# Patient Record
Sex: MALE | Race: OTHER | HISPANIC OR LATINO | Employment: FULL TIME | URBAN - METROPOLITAN AREA
[De-identification: names, ages, dates, MRNs, and addresses within clinical notes are randomized per-mention and may not be internally consistent; named-entity substitution may affect disease eponyms.]

---

## 2024-06-05 ENCOUNTER — HOSPITAL ENCOUNTER (EMERGENCY)
Facility: HOSPITAL | Age: 59
Discharge: ANOTHER HOSPITAL | End: 2024-06-05
Attending: EMERGENCY MEDICINE

## 2024-06-05 VITALS
HEART RATE: 71 BPM | WEIGHT: 180 LBS | BODY MASS INDEX: 28.93 KG/M2 | DIASTOLIC BLOOD PRESSURE: 64 MMHG | OXYGEN SATURATION: 100 % | SYSTOLIC BLOOD PRESSURE: 124 MMHG | HEIGHT: 66 IN | TEMPERATURE: 98 F | RESPIRATION RATE: 29 BRPM

## 2024-06-05 DIAGNOSIS — S32.591A CLOSED BILATERAL FRACTURE OF PUBIC RAMI, INITIAL ENCOUNTER: ICD-10-CM

## 2024-06-05 DIAGNOSIS — R07.9 CHEST PAIN: ICD-10-CM
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
## 2024-06-05 DIAGNOSIS — S32.592A CLOSED BILATERAL FRACTURE OF PUBIC RAMI, INITIAL ENCOUNTER: ICD-10-CM

## 2024-06-05 DIAGNOSIS — R52 PAIN: ICD-10-CM

## 2024-06-05 DIAGNOSIS — S35.00XA: Primary | ICD-10-CM

## 2024-06-05 DIAGNOSIS — S42.101A CLOSED FRACTURE OF RIGHT SCAPULA, UNSPECIFIED PART OF SCAPULA, INITIAL ENCOUNTER: ICD-10-CM

## 2024-06-05 LAB
ABO + RH BLD: NORMAL
ALBUMIN SERPL BCP-MCNC: 4.3 G/DL (ref 3.5–5.2)
ALLENS TEST: ABNORMAL
ALP SERPL-CCNC: 114 U/L (ref 55–135)
ALT SERPL W/O P-5'-P-CCNC: 85 U/L (ref 10–44)
AMPHET+METHAMPHET UR QL: NEGATIVE
ANION GAP SERPL CALC-SCNC: 9 MMOL/L (ref 8–16)
AST SERPL-CCNC: 106 U/L (ref 10–40)
BARBITURATES UR QL SCN>200 NG/ML: NEGATIVE
BASOPHILS # BLD AUTO: 0.09 K/UL (ref 0–0.2)
BASOPHILS NFR BLD: 0.7 % (ref 0–1.9)
BENZODIAZ UR QL SCN>200 NG/ML: NEGATIVE
BILIRUB SERPL-MCNC: 0.3 MG/DL (ref 0.1–1)
BILIRUB UR QL STRIP: NEGATIVE
BLD GP AB SCN CELLS X3 SERPL QL: NORMAL
BUN SERPL-MCNC: 16 MG/DL (ref 6–20)
BZE UR QL SCN: NEGATIVE
CALCIUM SERPL-MCNC: 9.4 MG/DL (ref 8.7–10.5)
CANNABINOIDS UR QL SCN: NEGATIVE
CHLORIDE SERPL-SCNC: 106 MMOL/L (ref 95–110)
CLARITY UR: CLEAR
CO2 SERPL-SCNC: 26 MMOL/L (ref 23–29)
COLOR UR: YELLOW
CREAT SERPL-MCNC: 1.2 MG/DL (ref 0.5–1.4)
CREAT SERPL-MCNC: 1.3 MG/DL (ref 0.5–1.4)
CREAT UR-MCNC: 90.4 MG/DL (ref 23–375)
DELSYS: ABNORMAL
DIFFERENTIAL METHOD BLD: ABNORMAL
EOSINOPHIL # BLD AUTO: 0.9 K/UL (ref 0–0.5)
EOSINOPHIL NFR BLD: 6.4 % (ref 0–8)
ERYTHROCYTE [DISTWIDTH] IN BLOOD BY AUTOMATED COUNT: 13.2 % (ref 11.5–14.5)
ERYTHROCYTE [SEDIMENTATION RATE] IN BLOOD BY WESTERGREN METHOD: 18 MM/H
EST. GFR  (NO RACE VARIABLE): >60 ML/MIN/1.73 M^2
ETHANOL SERPL-MCNC: <10 MG/DL
FIO2: 100
GLUCOSE SERPL-MCNC: 143 MG/DL (ref 70–110)
GLUCOSE SERPL-MCNC: 159 MG/DL (ref 70–110)
GLUCOSE UR QL STRIP: NEGATIVE
HCO3 UR-SCNC: 25.1 MMOL/L (ref 24–28)
HCT VFR BLD AUTO: 41 % (ref 40–54)
HCT VFR BLD CALC: 41 %PCV (ref 36–54)
HGB BLD-MCNC: 13.4 G/DL (ref 14–18)
HGB UR QL STRIP: ABNORMAL
IMM GRANULOCYTES # BLD AUTO: 0.21 K/UL (ref 0–0.04)
IMM GRANULOCYTES NFR BLD AUTO: 1.5 % (ref 0–0.5)
KETONES UR QL STRIP: NEGATIVE
LEUKOCYTE ESTERASE UR QL STRIP: NEGATIVE
LYMPHOCYTES # BLD AUTO: 3.9 K/UL (ref 1–4.8)
LYMPHOCYTES NFR BLD: 27.9 % (ref 18–48)
MAGNESIUM SERPL-MCNC: 2 MG/DL (ref 1.6–2.6)
MCH RBC QN AUTO: 29.5 PG (ref 27–31)
MCHC RBC AUTO-ENTMCNC: 32.7 G/DL (ref 32–36)
MCV RBC AUTO: 90 FL (ref 82–98)
MICROSCOPIC COMMENT: ABNORMAL
MODE: ABNORMAL
MONOCYTES # BLD AUTO: 0.7 K/UL (ref 0.3–1)
MONOCYTES NFR BLD: 5.3 % (ref 4–15)
NEUTROPHILS # BLD AUTO: 8.1 K/UL (ref 1.8–7.7)
NEUTROPHILS NFR BLD: 58.2 % (ref 38–73)
NITRITE UR QL STRIP: NEGATIVE
NRBC BLD-RTO: 0 /100 WBC
OPIATES UR QL SCN: ABNORMAL
PCO2 BLDA: 52.5 MMHG (ref 35–45)
PCP UR QL SCN>25 NG/ML: NEGATIVE
PEEP: 5
PH SMN: 7.29 [PH] (ref 7.35–7.45)
PH UR STRIP: 6 [PH] (ref 5–8)
PLATELET # BLD AUTO: 308 K/UL (ref 150–450)
PMV BLD AUTO: 10.1 FL (ref 9.2–12.9)
PO2 BLDA: 334 MMHG (ref 80–100)
POC BE: -2 MMOL/L
POC IONIZED CALCIUM: 1.23 MMOL/L (ref 1.06–1.42)
POC SATURATED O2: 100 % (ref 95–100)
POC TCO2: 27 MMOL/L (ref 23–27)
POTASSIUM BLD-SCNC: 3.9 MMOL/L (ref 3.5–5.1)
POTASSIUM SERPL-SCNC: 3.6 MMOL/L (ref 3.5–5.1)
PROT SERPL-MCNC: 7 G/DL (ref 6–8.4)
PROT UR QL STRIP: ABNORMAL
RBC # BLD AUTO: 4.54 M/UL (ref 4.6–6.2)
RBC #/AREA URNS HPF: 9 /HPF (ref 0–4)
SAMPLE: ABNORMAL
SAMPLE: NORMAL
SITE: ABNORMAL
SODIUM BLD-SCNC: 143 MMOL/L (ref 136–145)
SODIUM SERPL-SCNC: 141 MMOL/L (ref 136–145)
SP GR UR STRIP: >1.03 (ref 1–1.03)
SP02: 100
SPECIMEN OUTDATE: NORMAL
SQUAMOUS #/AREA URNS HPF: 0 /HPF
TOXICOLOGY INFORMATION: ABNORMAL
TROPONIN I SERPL HS-MCNC: 11.1 PG/ML (ref 0–14.9)
URN SPEC COLLECT METH UR: ABNORMAL
UROBILINOGEN UR STRIP-ACNC: NEGATIVE EU/DL
VT: 400
WBC # BLD AUTO: 13.84 K/UL (ref 3.9–12.7)
WBC #/AREA URNS HPF: 2 /HPF (ref 0–5)

## 2024-06-05 PROCEDURE — 96366 THER/PROPH/DIAG IV INF ADDON: CPT

## 2024-06-05 PROCEDURE — 82803 BLOOD GASES ANY COMBINATION: CPT

## 2024-06-05 PROCEDURE — 99291 CRITICAL CARE FIRST HOUR: CPT

## 2024-06-05 PROCEDURE — 94002 VENT MGMT INPAT INIT DAY: CPT

## 2024-06-05 PROCEDURE — 80053 COMPREHEN METABOLIC PANEL: CPT | Performed by: EMERGENCY MEDICINE

## 2024-06-05 PROCEDURE — 31500 INSERT EMERGENCY AIRWAY: CPT

## 2024-06-05 PROCEDURE — 96375 TX/PRO/DX INJ NEW DRUG ADDON: CPT

## 2024-06-05 PROCEDURE — 25000003 PHARM REV CODE 250: Performed by: EMERGENCY MEDICINE

## 2024-06-05 PROCEDURE — 87070 CULTURE OTHR SPECIMN AEROBIC: CPT | Performed by: EMERGENCY MEDICINE

## 2024-06-05 PROCEDURE — 84484 ASSAY OF TROPONIN QUANT: CPT | Performed by: EMERGENCY MEDICINE

## 2024-06-05 PROCEDURE — 82330 ASSAY OF CALCIUM: CPT

## 2024-06-05 PROCEDURE — 84132 ASSAY OF SERUM POTASSIUM: CPT

## 2024-06-05 PROCEDURE — 81001 URINALYSIS AUTO W/SCOPE: CPT | Performed by: EMERGENCY MEDICINE

## 2024-06-05 PROCEDURE — 99900031 HC PATIENT EDUCATION (STAT)

## 2024-06-05 PROCEDURE — 63600175 PHARM REV CODE 636 W HCPCS: Performed by: EMERGENCY MEDICINE

## 2024-06-05 PROCEDURE — 23650 CLTX SHO DSLC W/MNPJ WO ANES: CPT | Mod: RT

## 2024-06-05 PROCEDURE — 99900026 HC AIRWAY MAINTENANCE (STAT)

## 2024-06-05 PROCEDURE — 37799 UNLISTED PX VASCULAR SURGERY: CPT

## 2024-06-05 PROCEDURE — 25500020 PHARM REV CODE 255: Performed by: EMERGENCY MEDICINE

## 2024-06-05 PROCEDURE — 99900035 HC TECH TIME PER 15 MIN (STAT)

## 2024-06-05 PROCEDURE — 27100171 HC OXYGEN HIGH FLOW UP TO 24 HOURS

## 2024-06-05 PROCEDURE — 87205 SMEAR GRAM STAIN: CPT | Performed by: EMERGENCY MEDICINE

## 2024-06-05 PROCEDURE — 86901 BLOOD TYPING SEROLOGIC RH(D): CPT | Performed by: EMERGENCY MEDICINE

## 2024-06-05 PROCEDURE — 85025 COMPLETE CBC W/AUTO DIFF WBC: CPT | Performed by: EMERGENCY MEDICINE

## 2024-06-05 PROCEDURE — 84295 ASSAY OF SERUM SODIUM: CPT

## 2024-06-05 PROCEDURE — 51702 INSERT TEMP BLADDER CATH: CPT

## 2024-06-05 PROCEDURE — 96361 HYDRATE IV INFUSION ADD-ON: CPT

## 2024-06-05 PROCEDURE — 80307 DRUG TEST PRSMV CHEM ANLYZR: CPT | Performed by: EMERGENCY MEDICINE

## 2024-06-05 PROCEDURE — 83735 ASSAY OF MAGNESIUM: CPT | Performed by: EMERGENCY MEDICINE

## 2024-06-05 PROCEDURE — 82077 ASSAY SPEC XCP UR&BREATH IA: CPT | Performed by: EMERGENCY MEDICINE

## 2024-06-05 PROCEDURE — 96365 THER/PROPH/DIAG IV INF INIT: CPT

## 2024-06-05 PROCEDURE — 85014 HEMATOCRIT: CPT

## 2024-06-05 RX ORDER — MORPHINE SULFATE 2 MG/ML
6 INJECTION, SOLUTION INTRAMUSCULAR; INTRAVENOUS
Status: COMPLETED | OUTPATIENT
Start: 2024-06-05 | End: 2024-06-05

## 2024-06-05 RX ORDER — PROPOFOL 10 MG/ML
0-50 INJECTION, EMULSION INTRAVENOUS CONTINUOUS
Status: DISCONTINUED | OUTPATIENT
Start: 2024-06-05 | End: 2024-06-05 | Stop reason: HOSPADM

## 2024-06-05 RX ORDER — SUCCINYLCHOLINE CHLORIDE 20 MG/ML INJECTION SOLUTION
100 SOLUTION
Status: COMPLETED | OUTPATIENT
Start: 2024-06-05 | End: 2024-06-05

## 2024-06-05 RX ORDER — LIDOCAINE HYDROCHLORIDE 10 MG/ML
5 INJECTION, SOLUTION EPIDURAL; INFILTRATION; INTRACAUDAL; PERINEURAL
Status: COMPLETED | OUTPATIENT
Start: 2024-06-05 | End: 2024-06-05

## 2024-06-05 RX ORDER — ETOMIDATE 2 MG/ML
20 INJECTION INTRAVENOUS
Status: COMPLETED | OUTPATIENT
Start: 2024-06-05 | End: 2024-06-05

## 2024-06-05 RX ORDER — ONDANSETRON HYDROCHLORIDE 2 MG/ML
4 INJECTION, SOLUTION INTRAVENOUS
Status: DISCONTINUED | OUTPATIENT
Start: 2024-06-05 | End: 2024-06-05 | Stop reason: HOSPADM

## 2024-06-05 RX ADMIN — PROPOFOL 5 MCG/KG/MIN: 10 INJECTION, EMULSION INTRAVENOUS at 04:06

## 2024-06-05 RX ADMIN — Medication 100 MG: at 03:06

## 2024-06-05 RX ADMIN — MORPHINE SULFATE 6 MG: 2 INJECTION, SOLUTION INTRAMUSCULAR; INTRAVENOUS at 03:06

## 2024-06-05 RX ADMIN — LIDOCAINE HYDROCHLORIDE 50 MG: 10 INJECTION, SOLUTION EPIDURAL; INFILTRATION; INTRACAUDAL; PERINEURAL at 03:06

## 2024-06-05 RX ADMIN — ETOMIDATE 20 MG: 2 INJECTION INTRAVENOUS at 03:06

## 2024-06-05 RX ADMIN — IOHEXOL 100 ML: 350 INJECTION, SOLUTION INTRAVENOUS at 02:06

## 2024-06-05 RX ADMIN — SODIUM CHLORIDE, POTASSIUM CHLORIDE, SODIUM LACTATE AND CALCIUM CHLORIDE 1000 ML: 600; 310; 30; 20 INJECTION, SOLUTION INTRAVENOUS at 04:06

## 2024-06-05 RX ADMIN — SODIUM CHLORIDE 1000 ML: 9 INJECTION, SOLUTION INTRAVENOUS at 02:06

## 2024-06-05 NOTE — CARE UPDATE
06/05/24 1552   PRE-TX-O2   SpO2 98 %   Pulse 79   Resp (!) 24   Vent Select   Conventional Vent Y   Intubation? Initial intubation   $ Ventilator Initial 1   Charged w/in last 24h YES   Preset Conventional Ventilator Settings   Vent ID [id number]   Vent Type    Ventilation Type VC   Vent Mode A/C   Humidity HME   Set Rate 18 BPM   Vt Set 400 mL   PEEP/CPAP 5 cmH20   Conventional Ventilator Alarms   Alarms On Y   Ve High Alarm 25 L/min   Ve Low Alarm 3 L/min   Vt High Alarm 1000 mL   Vt Low Alarm 300 mL   Ready to Wean/Extubation Screen   PEEP <=8 (chart #) 5   Education   $ Education Ventilator Oxygen;45 min

## 2024-06-05 NOTE — ED PROVIDER NOTES
Encounter Date: 6/5/2024       History     Chief Complaint   Patient presents with    Fall     FELL OFF 2 STORY BUILDING, NO LOC    Shoulder Injury     RT    Abdominal Pain    Hip Pain    Neck Injury     HPI    Douglas Mayo is a 58 y.o. male with PMHx of HTN who presents for a fall,  30 feet. Denies LOC. Patient fell standing and then fell onto his right side. Reporting L ankle pain, R shoulder/back pain, and abdominal pain. Denies taking any medications PTA, son drive patient to the hospital.     Turkish speaking patient and family.       Review of patient's allergies indicates:  No Known Allergies  No past medical history on file.  No past surgical history on file.  No family history on file.     Review of Systems   Unable to perform ROS: Acuity of condition           Physical Exam     Initial Vitals   BP Pulse Resp Temp SpO2   06/05/24 1409 06/05/24 1409 06/05/24 1409 06/05/24 1411 06/05/24 1409   (!) 63/49 60 20 97.5 °F (36.4 °C) 95 %      MAP       --                Physical Exam    Nursing note and vitals reviewed.  Constitutional: He is diaphoretic. He is Obese . He is cooperative. He appears ill. He appears distressed.   HENT:   Head: Normocephalic and atraumatic.   Cardiovascular:  Regular rhythm and normal heart sounds.   Bradycardia present.         Pulmonary/Chest: Breath sounds normal. He has no wheezes. He exhibits tenderness. He exhibits no deformity.   Abdominal: He exhibits distension. There is abdominal tenderness (diffused). A hernia is present. Hernia confirmed positive in the left inguinal area. There is guarding.   Musculoskeletal:         General: Tenderness present. No edema.      Right shoulder: Deformity, tenderness and bony tenderness present. Decreased range of motion.      Left shoulder: Normal.      Cervical back: No spinous process tenderness or muscular tenderness.        Back:       Left ankle: No deformity. Tenderness present.     Neurological: He is alert and oriented  to person, place, and time. GCS score is 15. GCS eye subscore is 4. GCS verbal subscore is 5. GCS motor subscore is 6.   Skin: Skin is warm.         ED Course   Intubation    Date/Time: 6/5/2024 4:43 PM  Location procedure was performed: [facility] EMERGENCY DEPARTMENT    Performed by: Cely Murrieta MD  Authorized by: Steffen Bennett MD  Consent Done: Emergent Situation  Indications: airway protection  Intubation method: video-assisted  Patient status: paralyzed (RSI)  Preoxygenation: BVM  Sedatives: etomidate  Paralytic: succinylcholine  Laryngoscope size: Mac 4  Tube size: 7.5 mm  Tube type: cuffed  Number of attempts: 1  Cricoid pressure: no  Cords visualized: yes  Post-procedure assessment: chest rise, ETCO2 monitor and CO2 detector  Breath sounds: clear  Cuff inflated: yes  ETT to teeth: 22 cm  Tube secured with: ETT bruno  Chest x-ray interpreted by me.  Chest x-ray findings: endotracheal tube in appropriate position  Complications: No  Specimens: No  Implants: No      Critical Care    Date/Time: 6/5/2024 4:44 PM    Performed by: Cely Murrieta MD  Authorized by: Steffen Bennett MD  Direct patient critical care time: 10 minutes  Additional history critical care time: 5 minutes  Ordering / reviewing critical care time: 5 minutes  Documentation critical care time: 10 minutes  Consulting other physicians critical care time: 5 minutes  Consult with family critical care time: 7 minutes  Total critical care time (exclusive of procedural time) : 42 minutes  Critical care was necessary to treat or prevent imminent or life-threatening deterioration of the following conditions: cardiac failure, shock, CNS failure or compromise and respiratory failure.  Critical care was time spent personally by me on the following activities: development of treatment plan with patient or surrogate, discussions with consultants, evaluation of patient's response to treatment, examination of patient, obtaining history from patient or  surrogate, ordering and performing treatments and interventions, ordering and review of laboratory studies, ordering and review of radiographic studies, pulse oximetry and re-evaluation of patient's condition.      Orthopedic Injury    Date/Time: 6/5/2024 4:48 PM    Performed by: Cely Murrieta MD  Authorized by: Steffen Bennett MD    Location procedure was performed:  [facility] EMERGENCY DEPARTMENT  Consent Done?:  Emergent Situation  Injury:     Injury location:  Shoulder    Location details:  Right shoulder    Injury type:  Dislocation    Dislocation type: anterior      Chronicity:  New    Hill-Sachs deformity?: No        Pre-procedure assessment:     Neurovascular status: Neurovascularly intact      Distal perfusion: normal      Neurological function: normal      Range of motion: reduced        Selections made in this section will also lock the Injury type section above.:     Manipulation performed?: Yes      Reduction method:  Traction and counter traction    Reduction method:  Traction and counter traction    Reduction method:  Traction and counter traction    Reduction method:  Traction and counter traction    Reduction method:  Traction and counter traction    Reduction method:  Traction and counter traction    Reduction successful?: Yes      Confirmation: Reduction confirmed by x-ray      Complications: No      Specimens: No      Implants: No    Post-procedure assessment:     Neurovascular status: Neurovascularly intact      Neurological function comment:  Patient intubated    Range of motion: improved      Patient tolerance:  Patient tolerated the procedure well with no immediate complications    Labs Reviewed   CBC W/ AUTO DIFFERENTIAL - Abnormal; Notable for the following components:       Result Value    WBC 13.84 (*)     RBC 4.54 (*)     Hemoglobin 13.4 (*)     Immature Granulocytes 1.5 (*)     Gran # (ANC) 8.1 (*)     Immature Grans (Abs) 0.21 (*)     Eos # 0.9 (*)     All other components within  normal limits   COMPREHENSIVE METABOLIC PANEL - Abnormal; Notable for the following components:    Glucose 159 (*)      (*)     ALT 85 (*)     All other components within normal limits   ISTAT PROCEDURE - Abnormal; Notable for the following components:    POC PH 7.288 (*)     POC PCO2 52.5 (*)     POC PO2 334 (*)     POC Glucose 143 (*)     All other components within normal limits   CULTURE, RESPIRATORY   MAGNESIUM   TROPONIN I HIGH SENSITIVITY   ALCOHOL,MEDICAL (ETHANOL)   URINALYSIS, REFLEX TO URINE CULTURE   DRUG SCREEN PANEL, URINE EMERGENCY   TYPE & SCREEN   ISTAT CREATININE          Imaging Results              X-Ray Chest AP Portable (Final result)  Result time 06/05/24 16:51:12      Final result by Myrna Jiang MD (06/05/24 16:51:12)                   Impression:      Support devices in satisfactory position    Atelectasis in the mid lungs.      Electronically signed by: Myrna Jiang  Date:    06/05/2024  Time:    16:51               Narrative:    EXAMINATION:  XR CHEST AP PORTABLE    CLINICAL HISTORY:  Chest Pain;    FINDINGS:  Portable chest at 16:14 hours without comparisons shows normal cardiomediastinal silhouette. There is an NG tube there is an endotracheal tube with the tip 4 cm above the marcelle.  There is an NG tube with tip overlying the stomach.    There are linear opacities in the mid lungs which may be secondary to atelectasis.  There are no confluent infiltrates or pleural effusions.  There are no acute osseous abnormalities.                                       X-Ray Shoulder Complete 2 View Right (Final result)  Result time 06/05/24 16:53:28      Final result by Blue Flores MD (06/05/24 16:53:28)                   Impression:      1. Interval reduction of previous anterior shoulder dislocation.  2. Comminuted right scapular fracture.  3. Right acromioclavicular capsular injury.      Electronically signed by: Blue Flores  Date:    06/05/2024  Time:    16:53                Narrative:    EXAMINATION:  XR SHOULDER COMPLETE 2 OR MORE VIEWS RIGHT    CLINICAL HISTORY:  Pain, unspecified    FINDINGS:  Three views of the right shoulder at 16:24 hours compared to CT of the same day show interval reduction of previous anterior shoulder dislocation.  There is a comminuted right scapular fracture, with widening of the right acromioclavicular joint.                                       X-Ray Humerus 2 View Right (Final result)  Result time 06/05/24 16:52:06      Final result by Blue Flores MD (06/05/24 16:52:06)                   Impression:      Comminuted right scapular fracture, with widening of the acromioclavicular joint suggesting acute injury.      Electronically signed by: Blue Flores  Date:    06/05/2024  Time:    16:52               Narrative:    EXAMINATION:  XR HUMERUS 2 VIEW RIGHT    CLINICAL HISTORY:  Unspecified fall, initial encounter    FINDINGS:  Two views of the right humerus show no acute humeral fracture.  There is a comminuted right scapular fracture, with widening of the right acromioclavicular joint space.  Bone mineralization is normal.                                       CT Chest Abdomen Pelvis With IV Contrast (XPD) NO Oral Contrast (Final result)  Result time 06/05/24 15:25:06      Final result by Blue Flores MD (06/05/24 15:25:06)                   Impression:      1. Acute traumatic aortic injury of the abdominal aorta, with aortic intramural hemorrhage and retroperitoneal hemorrhage as described.  Vascular surgery consultation is recommended. RESULT NOTIFICATION: These findings and recommendations were discussed by Dr Flores with, and acknowledged by MARY DA SILVA MD at 15:12 hours.  2. Multiple splenic lacerations, without evidence of active arterial bleeding or perisplenic hematoma.  3. Acute comminuted right scapular fracture, with right anterior glenohumeral dislocation.  4. Nondisplaced pelvic fractures including right acetabular anterior  column, right iliac body, and bilateral superior and inferior pubic rami.  5. Nondisplaced left sacral ala fracture.  6. Additional observations as described      Electronically signed by: Blue Flores  Date:    06/05/2024  Time:    15:25               Narrative:    EXAMINATION:  CT CHEST ABDOMEN PELVIS WITH IV CONTRAST (XPD)    CLINICAL HISTORY:  s/p high mechanism fall 3 story;    TECHNIQUE:  Axial imaging through the chest, abdomen and pelvis was performed with 100 mL Omnipaque 350 IV contrast.    COMPARISON:  None.    FINDINGS:  CT CHEST: There is no evidence of acute traumatic thoracic aortic injury, with no thoracic aortic dissection or intramural hemorrhage.  The central pulmonary arteries enhance normally, with the heart normal in size, and no pericardial effusion.  No mediastinal hemorrhage or pneumomediastinum.    The lungs are normally expanded, with scattered ground-glass densities in the lower lungs suggesting subsegmental atelectasis.  No pneumothorax or pleural effusion.  The central airways are patent.    There is an acute comminuted fracture of the right scapular body, with anterior glenohumeral dislocation.  No acute displaced rib fractures or thoracic spine fractures.    CT ABDOMEN: There is evidence of acute traumatic injury to the infrarenal abdominal aorta, with wall thickening and acute intramural hemorrhage, as well as surrounding retroperitoneal fat stranding reflecting hemorrhage and edema.  There is no discrete aortic dissection or aortic aneurysm, with tiny hypodense focus in the celiac artery image 139 series 3, potentially intraluminal thrombus.  The celiac artery and its branches, SMA and PAXTON are patent.  The bilateral renal arteries are not well visualized, with questionable filling defect within the right renal artery ostium.    The liver enhances normally.  There are multiple wedge-shaped hypodensities in the spleen reflecting splenic lacerations, without perisplenic hemorrhage or  fluid.  The pancreas, adrenal glands and kidneys enhance normally.  There is no bowel obstruction, ascites, or intraperitoneal free air.    The bilateral common iliac arteries, external iliac arteries, internal iliac arteries and visualized common femoral and superficial femoral arteries enhance normally.    CT PELVIS: The rectum and urinary bladder enhance normally, with left inguinal hernia containing a portion of the urinary bladder and fat.  There are nondisplaced fractures of the left sacral ala, the right acetabulum anterior column extending into the iliac body, and of the bilateral superior and inferior pubic rami, including segmental fracture of the right inferior pubic ramus.                                       CT Cervical Spine Without Contrast (Final result)  Result time 06/05/24 15:07:06      Final result by Blue Flores MD (06/05/24 15:07:06)                   Impression:      Negative CT cervical spine without contrast.      Electronically signed by: Blue Flores  Date:    06/05/2024  Time:    15:07               Narrative:    EXAMINATION:  CT CERVICAL SPINE WITHOUT CONTRAST    CLINICAL HISTORY:  s/p fall; from 2 story building.    TECHNIQUE:  Axial noncontrast imaging was performed, with sagittal and coronal reformatted images reviewed.    COMPARISON:  None.    FINDINGS:  The cervical spine has normal alignment and curvature, with no acute fractures or destructive osseous lesions.  There is mild multilevel intervertebral disc space narrowing, with no interfacetal subluxation or dislocation.    The craniocervical junction and prevertebral soft tissues are normal. There is no evidence of spinal epidural hematoma, with no acute cervical soft tissue abnormalities.    The lung apices are clear.  Sagittal and coronal reformatted images show no acute fracture or malalignment.                                       CT Head Without Contrast (Final result)  Result time 06/05/24 15:00:46      Final result by  Blue Flores MD (06/05/24 15:00:46)                   Impression:      1. A 20 mm solid mass in the sella, nonspecific.  Primary pituitary neoplasm, metastatic disease, or lymphoproliferative disorder could all have this appearance.  Outpatient neurosurgical referral and further evaluation with nonemergent outpatient MRI without and with IV contrast are recommended.  2. Negative for acute intracranial hemorrhage or acute calvarial fracture.      Electronically signed by: Blue Flores  Date:    06/05/2024  Time:    15:00               Narrative:    EXAMINATION:  CT HEAD WITHOUT CONTRAST    CLINICAL HISTORY:  blunt trauma;    FINDINGS:  No prior studies for comparison.  There is no acute intracranial hemorrhage or abnormal extra-axial fluid.  There is 20 mm dense solid mass with lobular margins arising within the sella, and projecting downward into the sphenoid sinus, nonspecific.    Gray-white differentiation is normal, with the cortical sulci and ventricles normal in size for age. The cerebellum and brainstem are grossly unremarkable. The calvarium is intact, with no acute fractures. The visualized paranasal sinuses and mastoid air cells are clear. There is no scalp hematoma or radiopaque foreign body.                                       X-Ray Ankle Complete Bilateral (No Result on File)    Procedure changed from X-Ray Ankle Complete Right                    Medications   lactated ringers bolus 1,000 mL (1,000 mLs Intravenous New Bag 6/5/24 1600)   lactated ringers bolus 1,000 mL (has no administration in time range)   propofol (DIPRIVAN) 10 mg/mL infusion (25 mcg/kg/min × 81.6 kg Intravenous Rate/Dose Change 6/5/24 1645)   ondansetron injection 4 mg (has no administration in time range)   sodium chloride 0.9% bolus 1,000 mL 1,000 mL (0 mLs Intravenous Stopped 6/5/24 1515)   iohexoL (OMNIPAQUE 350) injection 100 mL (100 mLs Intravenous Given 6/5/24 1456)   morphine injection 6 mg (6 mg Intravenous Given 6/5/24  1515)   LIDOcaine (PF) 10 mg/ml (1%) injection 50 mg (50 mg Infiltration Given 6/5/24 1530)   etomidate injection 20 mg (20 mg Intravenous Given 6/5/24 1551)   succinylcholine chloride 200 mg/10 mL (20 mg/mL) IV Syringe 100 mg (100 mg Intravenous Given 6/5/24 1551)     Medical Decision Making  Douglas aMyo is a 58 y.o. Mongolian-speaking male who presents after a 30 ft fall, patient landed standing, then landed on his right side.  Denies any LOC or head trauma.  Patient was brought in by son.  On exam patient is uncomfortable, diaphoretic, in obvious distress, tenderness to palpation to the right shoulder, with obvious deformity of the right shoulder concerning for dislocation, tenderness to palpation to left ankle diffusely, no obvious deformity.  Patient tender to palpation to abdomen diffusely, but abdomen is soft.  Patient has a left inguinal hernia.  C-collar was quickly placed on patient number as patient was placed in a ED room bed.  In patient was found to be hypotensive therefore given a L bolus of normal saline.  With improvement of his vitals.  Patient was quickly taken to CT, for head non con which was negative for any intracranial bleed, C-spine CT which was negative for any fracture deformity dislocation, and CT abdomen pelvis and chest with IV contrast.  Radiology called me with concern findings of a abdominal aortic injury with surrounding hemorrhage.  I quickly called vascular surgery on-call, who is recommending patient to be transferred out to a trauma center concerning for polytrauma.  Patient was intubated for airway protection prior to transportation to a trauma facility.  A line was placed to the left radial artery, and right shoulder was reduced.  Patient started on propofol, and given fentanyl pushes for pain management.  Patient was accepted at [facility].  On further read by Radiology, CT chest, abdomen pelvis showed acute comminuted fracture of the right scapular body with anterior  glenohumeral dislocation.  For there is also evidence of acute traumatic injury to the infrarenal abdominal aorta with wall thickening and acute intramural hemorrhage, with surrounding retroperitoneal fat stranding reflecting hemorrhage and edema.  No discrete dissection or aneurysm was noted but tiny hypo dense focus in the celiac artery concerning for thrombus.  Splenic lacerations were also noted.  And left inguinal hernia was seen containing portion of the urinary bladder and fat.  There is also nondisplaced fracture of the left sacral ala, the right acetabular anterior column and bilateral superior and inferior pubic rami. Hernia was reduced. Son was updated at bedside and will be going to [facility] as well in private vehicle.     Amount and/or Complexity of Data Reviewed  Labs: ordered.  Radiology: ordered.    Risk  Prescription drug management.  Decision regarding hospitalization.               ED Course as of 06/05/24 1658 Wed Jun 05, 2024   1510 I was called by radiology to informed me of a Acute aortic injury abd, intramural hematoma with acute hemmorage.  vascular surgey would be needed. R scapula fx with shoulder dislocation. Spoke to Vascular surgery who recommended patient to be transferred to a trauma center for polytrauma. [MR]   1544 Paco Viera: Brother: [phone]    Douglas Viera: Adult son  [phone] (main person of contact) [MR]   1642 Patient be transferred to [facility], for Trauma surgery, vascular, and Orthopedics, potentially urology.  [MR]   1657 WBC(!): 13.84  Could be stress response. [MR]   1657 Hemoglobin(!): 13.4  No anemia. [MR]   1657 AST(!): 106  Mild liver injury [MR]   1657 ALT(!): 85  Could reflect Mild liver injury [MR]   1657 Troponin I High Sensitivity: 11.1 [MR]      ED Course User Index  [MR] Ceyl Murrieta MD                           Clinical Impression:  Final diagnoses:  [R07.9] Chest pain  [R52] Pain  [W19.XXXA] Fall  [S35.00XA] Abdominal aorta injury, initial  encounter (Primary)  [S42.101A] Closed fracture of right scapula, unspecified part of scapula, initial encounter  [S32.591A, S32.592A] Closed bilateral fracture of pubic rami, initial encounter          ED Disposition Condition    Transfer to Another Facility Stable                Cely Murrieta MD  Resident  06/05/24 7529

## 2024-06-07 LAB
BACTERIA SPEC AEROBE CULT: NORMAL
GRAM STN SPEC: NORMAL